# Patient Record
Sex: FEMALE | Race: WHITE | NOT HISPANIC OR LATINO | URBAN - METROPOLITAN AREA
[De-identification: names, ages, dates, MRNs, and addresses within clinical notes are randomized per-mention and may not be internally consistent; named-entity substitution may affect disease eponyms.]

---

## 2020-03-12 ENCOUNTER — OFFICE VISIT - HEALTHEAST (OUTPATIENT)
Dept: ONCOLOGY | Facility: CLINIC | Age: 50
End: 2020-03-12

## 2020-03-12 DIAGNOSIS — Z71.83 ENCOUNTER FOR NONPROCREATIVE GENETIC COUNSELING: ICD-10-CM

## 2020-03-12 DIAGNOSIS — Z80.3 FAMILY HISTORY OF MALIGNANT NEOPLASM OF BREAST: ICD-10-CM

## 2021-05-24 ENCOUNTER — RECORDS - HEALTHEAST (OUTPATIENT)
Dept: ADMINISTRATIVE | Facility: CLINIC | Age: 51
End: 2021-05-24

## 2021-05-25 ENCOUNTER — RECORDS - HEALTHEAST (OUTPATIENT)
Dept: ADMINISTRATIVE | Facility: CLINIC | Age: 51
End: 2021-05-25

## 2021-05-28 ENCOUNTER — RECORDS - HEALTHEAST (OUTPATIENT)
Dept: ADMINISTRATIVE | Facility: CLINIC | Age: 51
End: 2021-05-28

## 2021-06-06 NOTE — PROGRESS NOTES
3/12/2020    Referring Provider: [facility]    Presenting Information:   I met with Jaelyn Nielson today for genetic counseling at [facility] to discuss her family history of breast cancer.  She is here today to review this history, cancer screening recommendations, and available genetic testing options.    Personal History:  Jaelyn is a 50 y.o. female. She does not have any personal history of cancer. Jaelyn reports that she is s/p right ovary removal and partial left ovary removal in 2004 due to ovarian cysts.       She had her first menstrual period at age 15, her first child at age 25, and reports that she is perimenopausal.  Jaelyn has her ovaries, fallopian tubes and uterus in place, and she has had no ovarian cancer screening to date.  She reports approximately a 10-year history of oral contraceptive use beginning around age [age] and discontinued after her last pregnancy at age 32 and that she has never been on hormone replacement therapy.      Her most recent OB-GYN exam and Pap smear in February of 2020 were reportedly normal.  She reports that she does not do annual mammograms. Her most recent mammogram she reports was in February of 2020 and was normal. She does report that she does monthly self-breast exams.  Jaelyn has not had a colonoscopy,but has received a fecal testing kit from her doctor that she plans on completing soon.  She does not regularly do any other cancer screening at this time.  Jaelyn reported no tobacco use, and about 3 drinks per week for alcohol use.    Family History: (Please see scanned pedigree for detailed family history information)  Children/Siblings    Jaelyn has three daughters, ages 19, 22, and 24, and one son, age 18; all reportedly healthy.    Jaelyn's sister, age 52, has no reported cancer history. Jaelyn reports that this sister had a CLOVIS at age 50 due to bleeding issues.    Jaelyn's sister, age 52, has a history of breast cancer diagnosed at age 51  "for which she underwent a double mastectomy and chemotherapy. Jaelyn also reports that this sister's breast cancer was \"aggressive\". This sister underwent genetic through [employer]'s 47-gene panel and was discovered to carry a variant of uncertain significance in her BARD1 gene.     Jaelyn's sister, age 49, has no reported cancer history. Jaelyn reports that this sister has a congenital jaw bone abnormality but is otherwise healthy.  Maternal    Jaelyn's mother, age 71, has no reported cancer history. Jaelyn reports that her mother had her ovaries removed in her 30's due to ovarian cysts.    Jaelyn has four maternal aunts; all reportedly healthy with no cancer histories.     Jaelyn has three maternal uncles who have passed away; two in their 60's from unknown cancers.    Jaelyn's maternal grandmother passed away in her 60's with no reported cancer history.    Jaelyn's maternal grandfather passed away at age 80 from age-related health issues with no reported cancer history.  Paternal    Jaelyn's father passed away at age 71 from lung cancer. He was a smoker.    Jaelyn has a paternal aunt, age 76, with no reported cancer history.    Jaelyn has a paternal aunt who passed away at age 71 from lung cancer. She was a smoker.    Jaelyn has two paternal uncles who passed away with no reported cancer histories.    Jaelyn's paternal grandmother, age 100, has no reported cancer history.    Jaelyn's paternal grandmother has a sister who passed away with breast cancer history.    Jaelyn's paternal grandmother has a sister who passed away with stomach cancer history.    Jaelyn's paternal grandfather passed away at age 70 due to a car accident. He had mesothelioma with a known asbestos exposure.    Her maternal ethnicity is [place]. Her paternal ethnicity is Armenian, [place], [place], and .  There is no known Ashkenazi Zoroastrianism ancestry on either side of her family. There is no reported consanguinity.    Discussion:    Jaelyn has a family history " of breast cancer in her sister and paternal great-aunt. Her sister completed comprehensive genetic testing with no clinically actionable variants (mutations) detected. Based upon her current personal and family history, Jaelyn is likely at low risk for a hereditary cancer syndrome.    We discussed the features commonly associated with hereditary cancer syndromes, and a handout regarding this was provided to Jaelyn today.    Because her sister who is her closest relative has had negative genetic testing and she does not otherwise meet criteria, it is unlikely that there is a currently identifiable hereditary cancer syndrome present in Jaelyn's family.     We discussed her sister genetic testing results in detail. Her sister had genetic testing via the 47-gene Common Hereditary Cancers Panel through [employer]. A variant of uncertain significance was identified in her BARD1 gene. This variant is writtedn c.1339C>G (also known as p.[id number]). We discussed what a variant of uncertain significance is and that medical management does not change based on variants of uncertain significance. We discussed that 90% of the time, variants of uncertain significance eventually get reclassified as benign variants. We do no recommend testing relatives for variants of uncertain significance. Jaelyn expressed understanding.     We discussed that insurance coverage for genetic testing is dependent upon personal and family history being suggestive of a hereditary cancer syndrome. I reiterated that, at this time, Jaelyn does not meet criteria for genetic testing. We did briefly discuss patient pay options through laboratories if Jaelyn should be interested in pursuing genetic testing.     We discussed the importance of Jaelyn contacting me if her personal or family history changes. This may modify our assessment regarding risk for a hereditary cancer syndrome and/or genetic testing options.     Screening recommendations based upon personal  and family history:    Based on her personal and family history, Jaelyn has a 11.6% lifetime risk of developing breast cancer based on the ALBARO (v8) model.  Therefore, Jaelyn does not meet current National Comprehensive Cancer Network (NCCN) guidelines for high risk breast screening, which is offered to women with a 20% lifetime risk or higher. However, it is still important for Jaelyn to continue with routine breast screening under the care of her physicians. Breast cancer screening is generally recommended to begin approximately 10 years younger than the earliest age of breast cancer diagnosis in the family, or at age 40, whichever comes first.  In this family, screening may begin at age 40.  Jaelyn is encouraged to discuss breast screening with her physicians.     Other population cancer screening options, such as those recommended by the American Cancer Society and the National Comprehensive Cancer Network (NCCN), are also appropriate for Jaelyn and her family. These screening recommendations may change if there are changes to Jaelyn's personal and/or family history. Final screening recommendations should be made by each individual's managing physician.    Plan:  1) Jaelyn agreed with my assessment and elected to have no genetic testing at this time.   2) Information regarding recommended screening, based upon the family history, was reviewed for Jaelyn.  3) Jaelyn will contact me annually and/or if the family or personal history changes. My contact information was provided.     Face to face time: 45    Denise Tillman MS, [facility]  Licensed Genetic Counselor  [facility]  [phone]

## 2021-06-20 NOTE — LETTER
Letter by Denise Tillman, Genetic Counselor at      Author: Denise Tillman, Genetic Counselor Service: -- Author Type: --    Filed:  Encounter Date: 3/12/2020 Status: (Other)         Jaelyn Nielson  [id number] [facility] [id number]      March 18, 2020      Dear Ms. Nielson,    It was a pleasure meeting with you at [facility] on 03/12/2020.  Here is a copy of the progress note from your recent genetic counseling visit.  If you have any additional questions, please feel free to call.    Referring Provider: [facility]    Presenting Information:   I met with Jaelyn Nielson today for genetic counseling at [facility] to discuss her family history of breast cancer.  She is here today to review this history, cancer screening recommendations, and available genetic testing options.    Personal History:  Jaelyn is a 50 y.o. female. She does not have any personal history of cancer. Jaelyn reports that she is s/p right ovary removal and partial left ovary removal in 2004 due to ovarian cysts.       She had her first menstrual period at age 15, her first child at age 25, and reports that she is perimenopausal.  Jaelyn has her ovaries, fallopian tubes and uterus in place, and she has had no ovarian cancer screening to date.  She reports approximately a 10-year history of oral contraceptive use beginning around age [age] and discontinued after her last pregnancy at age 32 and that she has never been on hormone replacement therapy.      Her most recent OB-GYN exam and Pap smear in February of 2020 were reportedly normal.  She reports that she does not do annual mammograms. Her most recent mammogram she reports was in February of 2020 and was normal. She does report that she does monthly self-breast exams.  Jaelyn has not had a colonoscopy,but has received a fecal testing kit from her doctor that she plans on completing soon.  She does not regularly do any other  "cancer screening at this time.  Jaelyn reported no tobacco use, and about 3 drinks per week for alcohol use.    Family History: (Please see scanned pedigree for detailed family history information)  Children/Siblings    Jaelyn has three daughters, ages 19, 22, and 24, and one son, age 18; all reportedly healthy.    Jaelyn's sister, age 52, has no reported cancer history. Jaelyn reports that this sister had a CLOVIS at age 50 due to bleeding issues.    Jaelyn's sister, age 52, has a history of breast cancer diagnosed at age 51 for which she underwent a double mastectomy and chemotherapy. Jaelyn also reports that this sister's breast cancer was \"aggressive\". This sister underwent genetic through [employer]'s 47-gene panel and was discovered to carry a variant of uncertain significance in her BARD1 gene.     Jaelyn's sister, age 49, has no reported cancer history. Jaelyn reports that this sister has a congenital jaw bone abnormality but is otherwise healthy.  Maternal    Jaelyn's mother, age 71, has no reported cancer history. Jaelyn reports that her mother had her ovaries removed in her 30's due to ovarian cysts.    Jaelyn has four maternal aunts; all reportedly healthy with no cancer histories.     Jaelyn has three maternal uncles who have passed away; two in their 60's from unknown cancers.    Jaelyn's maternal grandmother passed away in her 60's with no reported cancer history.    Jaelyn's maternal grandfather passed away at age 80 from age-related health issues with no reported cancer history.  Paternal    Jaelyn's father passed away at age 71 from lung cancer. He was a smoker.    Jaelyn has a paternal aunt, age 76, with no reported cancer history.    Jaelyn has a paternal aunt who passed away at age 71 from lung cancer. She was a smoker.    Jaelyn has two paternal uncles who passed away with no reported cancer histories.    Jaelyn's paternal grandmother, age 100, has no reported cancer history.    Jaelyn's paternal grandmother has a sister who passed away " with breast cancer history.    Jaelyn's paternal grandmother has a sister who passed away with stomach cancer history.    Jaelyn's paternal grandfather passed away at age 70 due to a car accident. He had mesothelioma with a known asbestos exposure.    Her maternal ethnicity is [place] citizen [place]. Her paternal ethnicity is [place], [place]ese, [place], and .  There is no known Ashkenazi Spiritism ancestry on either side of her family. There is no reported consanguinity.    Discussion:    Jaelyn has a family history of breast cancer in her sister and paternal great-aunt. Her sister completed comprehensive genetic testing with no clinically actionable variants (mutations) detected. Based upon her current personal and family history, Jaelyn is likely at low risk for a hereditary cancer syndrome.    We discussed the features commonly associated with hereditary cancer syndromes, and a handout regarding this was provided to Jaelyn today.    Because her sister who is her closest relative has had negative genetic testing and she does not otherwise meet criteria, it is unlikely that there is a currently identifiable hereditary cancer syndrome present in Jaelyn's family.     We discussed her sister genetic testing results in detail. Her sister had genetic testing via the 47-gene Common Hereditary Cancers Panel through [employer]. A variant of uncertain significance was identified in her BARD1 gene. This variant is writtedn c.1339C>G (also known as p.[id number]). We discussed what a variant of uncertain significance is and that medical management does not change based on variants of uncertain significance. We discussed that 90% of the time, variants of uncertain significance eventually get reclassified as benign variants. We do no recommend testing relatives for variants of uncertain significance. Jaelyn expressed understanding.     We discussed that insurance coverage for genetic testing is dependent upon personal and  family history being suggestive of a hereditary cancer syndrome. I reiterated that, at this time, Jaelyn does not meet criteria for genetic testing. We did briefly discuss patient pay options through laboratories if Jaelyn should be interested in pursuing genetic testing.     We discussed the importance of Jaelyn contacting me if her personal or family history changes. This may modify our assessment regarding risk for a hereditary cancer syndrome and/or genetic testing options.     Screening recommendations based upon personal and family history:    Based on her personal and family history, Jaelyn has a 11.6% lifetime risk of developing breast cancer based on the ALBARO (v8) model.  Therefore, Jaelyn does not meet current National Comprehensive Cancer Network (NCCN) guidelines for high risk breast screening, which is offered to women with a 20% lifetime risk or higher. However, it is still important for Jaelyn to continue with routine breast screening under the care of her physicians. Breast cancer screening is generally recommended to begin approximately 10 years younger than the earliest age of breast cancer diagnosis in the family, or at age 40, whichever comes first.  In this family, screening may begin at age 40.  Jaelyn is encouraged to discuss breast screening with her physicians.     Other population cancer screening options, such as those recommended by the American Cancer Society and the National Comprehensive Cancer Network (NCCN), are also appropriate for Jaelyn and her family. These screening recommendations may change if there are changes to Jaelyn's personal and/or family history. Final screening recommendations should be made by each individual's managing physician.    Plan:  1) Jaelyn agreed with my assessment and elected to have no genetic testing at this time.   2) Information regarding recommended screening, based upon the family history, was reviewed for Jaelyn.  3) Jaelyn will contact me annually and/or if the family or  personal history changes. My contact information was provided.     Denise Tillman MS, [facility]  Licensed Genetic Counselor  [facility]  [phone]

## 2021-07-13 ENCOUNTER — RECORDS - HEALTHEAST (OUTPATIENT)
Dept: ADMINISTRATIVE | Facility: CLINIC | Age: 51
End: 2021-07-13